# Patient Record
Sex: FEMALE | URBAN - METROPOLITAN AREA
[De-identification: names, ages, dates, MRNs, and addresses within clinical notes are randomized per-mention and may not be internally consistent; named-entity substitution may affect disease eponyms.]

---

## 2022-10-17 ENCOUNTER — APPOINTMENT (RX ONLY)
Dept: URBAN - METROPOLITAN AREA CLINIC 28 | Facility: CLINIC | Age: 67
Setting detail: DERMATOLOGY
End: 2022-10-17

## 2022-10-17 DIAGNOSIS — L30.4 ERYTHEMA INTERTRIGO: ICD-10-CM | Status: INADEQUATELY CONTROLLED

## 2022-10-17 DIAGNOSIS — L82.1 OTHER SEBORRHEIC KERATOSIS: ICD-10-CM

## 2022-10-17 DIAGNOSIS — D22 MELANOCYTIC NEVI: ICD-10-CM

## 2022-10-17 DIAGNOSIS — L81.4 OTHER MELANIN HYPERPIGMENTATION: ICD-10-CM

## 2022-10-17 DIAGNOSIS — D18.0 HEMANGIOMA: ICD-10-CM

## 2022-10-17 DIAGNOSIS — L29.8 OTHER PRURITUS: ICD-10-CM | Status: INADEQUATELY CONTROLLED

## 2022-10-17 DIAGNOSIS — L29.89 OTHER PRURITUS: ICD-10-CM | Status: INADEQUATELY CONTROLLED

## 2022-10-17 DIAGNOSIS — Z71.89 OTHER SPECIFIED COUNSELING: ICD-10-CM

## 2022-10-17 PROBLEM — D18.01 HEMANGIOMA OF SKIN AND SUBCUTANEOUS TISSUE: Status: ACTIVE | Noted: 2022-10-17

## 2022-10-17 PROBLEM — D22.5 MELANOCYTIC NEVI OF TRUNK: Status: ACTIVE | Noted: 2022-10-17

## 2022-10-17 PROCEDURE — ? TREATMENT REGIMEN

## 2022-10-17 PROCEDURE — 99204 OFFICE O/P NEW MOD 45 MIN: CPT | Mod: 25

## 2022-10-17 PROCEDURE — ? SUNSCREEN RECOMMENDATIONS

## 2022-10-17 PROCEDURE — ? PRESCRIPTION

## 2022-10-17 PROCEDURE — ? PHOTO-DOCUMENTATION

## 2022-10-17 PROCEDURE — 11301 SHAVE SKIN LESION 0.6-1.0 CM: CPT

## 2022-10-17 PROCEDURE — ? PRESCRIPTION MEDICATION MANAGEMENT

## 2022-10-17 PROCEDURE — ? SHAVE REMOVAL

## 2022-10-17 PROCEDURE — ? MEDICATION COUNSELING

## 2022-10-17 PROCEDURE — ? COUNSELING

## 2022-10-17 RX ORDER — DOXEPIN HYDROCHLORIDE 25 MG/1
CAPSULE ORAL QHS
Qty: 30 | Refills: 3 | Status: ERX | COMMUNITY
Start: 2022-10-17 | End: 2022-10-25

## 2022-10-17 RX ORDER — LIDOCAINE AND PRILOCAINE 25; 25 MG/G; MG/G
CREAM TOPICAL QDAY
Qty: 30 | Refills: 3 | Status: ERX | COMMUNITY
Start: 2022-10-17 | End: 2022-10-25

## 2022-10-17 RX ORDER — KETOCONAZOLE 20 MG/G
CREAM TOPICAL BID
Qty: 60 | Refills: 3 | Status: ERX | COMMUNITY
Start: 2022-10-17 | End: 2022-10-25

## 2022-10-17 RX ADMIN — DOXEPIN HYDROCHLORIDE 25: 25 CAPSULE ORAL at 00:00

## 2022-10-17 RX ADMIN — LIDOCAINE AND PRILOCAINE 2.5-2.5: 25; 25 CREAM TOPICAL at 00:00

## 2022-10-17 RX ADMIN — KETOCONAZOLE 2: 20 CREAM TOPICAL at 00:00

## 2022-10-17 ASSESSMENT — LOCATION DETAILED DESCRIPTION DERM
LOCATION DETAILED: LEFT MID-UPPER BACK
LOCATION DETAILED: RIGHT ANTERIOR PROXIMAL THIGH
LOCATION DETAILED: SUPERIOR THORACIC SPINE
LOCATION DETAILED: RIGHT DORSAL WRIST
LOCATION DETAILED: LEFT INFERIOR LATERAL UPPER BACK
LOCATION DETAILED: LEFT ANTERIOR PROXIMAL THIGH

## 2022-10-17 ASSESSMENT — LOCATION SIMPLE DESCRIPTION DERM
LOCATION SIMPLE: UPPER BACK
LOCATION SIMPLE: LEFT THIGH
LOCATION SIMPLE: RIGHT WRIST
LOCATION SIMPLE: RIGHT THIGH
LOCATION SIMPLE: LEFT UPPER BACK
LOCATION SIMPLE: LEFT UPPER BACK

## 2022-10-17 ASSESSMENT — LOCATION ZONE DERM
LOCATION ZONE: TRUNK
LOCATION ZONE: ARM
LOCATION ZONE: TRUNK
LOCATION ZONE: LEG

## 2022-10-17 NOTE — PROCEDURE: PRESCRIPTION MEDICATION MANAGEMENT
Detail Level: Zone
Continue Regimen: Pregabalin
Render In Strict Bullet Format?: No
Initiate Treatment: lidocaine-prilocaine 2.5 %-2.5 % topical cream: Apply a thin layer QDAY to arm\\ndoxepin 25 mg capsule: Take one tab po QHS
Initiate Treatment: ketoconazole 2 % topical cream: Apply a thin layer to AA of body BID until clear then only use as needed

## 2022-10-17 NOTE — PROCEDURE: SHAVE REMOVAL
Medical Necessity Information: It is in your best interest to select a reason for this procedure from the list below. All of these items fulfill various CMS LCD requirements except the new and changing color options.
Medical Necessity Clause: This procedure was medically necessary because the lesion that was treated was:
Lab: 6
Lab Facility: 0
Detail Level: Detailed
Was A Bandage Applied: Yes
Size Of Lesion In Cm (Required): 0.8
Biopsy Method: Dermablade
Anesthesia Type: 1% lidocaine with epinephrine
Anesthesia Volume In Cc: 1
Hemostasis: Drysol
Wound Care: Petrolatum
Render Path Notes In Note?: No
Consent was obtained from the patient. The risks and benefits to therapy were discussed in detail. Specifically, the risks of infection, scarring, bleeding, prolonged wound healing, incomplete removal, allergy to anesthesia, nerve injury and recurrence were addressed. Prior to the procedure, the treatment site was clearly identified and confirmed by the patient. All components of Universal Protocol/PAUSE Rule completed.
Post-Care Instructions: I reviewed with the patient in detail post-care instructions. Patient is to keep the biopsy site dry overnight, and then apply bacitracin twice daily until healed. Patient may apply hydrogen peroxide soaks to remove any crusting.
Notification Instructions: Patient will be notified of pathology results. However, patient instructed to call the office if not contacted within 2 weeks.
Billing Type: Third-Party Bill

## 2022-10-17 NOTE — HPI: RASH
What Type Of Note Output Would You Prefer (Optional)?: Standard Output
Is The Patient Presenting As Previously Scheduled?: Yes
How Severe Is Your Rash?: moderate
Is This A New Presentation, Or A Follow-Up?: Rash
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

## 2022-10-17 NOTE — PROCEDURE: TREATMENT REGIMEN
Initiate Treatment: I counseled the patient regarding the following:\\nInstructions: Monthly self-skin checks to monitor for any changes in moles are recommended.\\n\\nExpectations: Benign Nevi are pigmented nests of cells within the skin. No treatment is necessary.\\nContact Office if: Any moles change in size, shape or color; itch, burn or bleed.\\n\\nPatient was reassured that these benign nevi do not appear concerning, and to alert me if any changes (e.g. asymmetry, irregular borders, change in color, size, etc.) are observed.
Detail Level: Zone

## 2022-10-25 ENCOUNTER — APPOINTMENT (RX ONLY)
Dept: URBAN - METROPOLITAN AREA CLINIC 374 | Facility: CLINIC | Age: 67
Setting detail: DERMATOLOGY
End: 2022-10-25

## 2022-10-25 DIAGNOSIS — D485 NEOPLASM OF UNCERTAIN BEHAVIOR OF SKIN: ICD-10-CM

## 2022-10-25 DIAGNOSIS — R20.2 PARESTHESIA OF SKIN: ICD-10-CM | Status: INADEQUATELY CONTROLLED

## 2022-10-25 PROBLEM — D48.5 NEOPLASM OF UNCERTAIN BEHAVIOR OF SKIN: Status: ACTIVE | Noted: 2022-10-25

## 2022-10-25 PROCEDURE — 11301 SHAVE SKIN LESION 0.6-1.0 CM: CPT

## 2022-10-25 PROCEDURE — ? PRESCRIPTION

## 2022-10-25 PROCEDURE — 99214 OFFICE O/P EST MOD 30 MIN: CPT | Mod: 25

## 2022-10-25 PROCEDURE — ? COUNSELING

## 2022-10-25 PROCEDURE — ? PRESCRIPTION MEDICATION MANAGEMENT

## 2022-10-25 PROCEDURE — ? SHAVE REMOVAL

## 2022-10-25 RX ORDER — PRAMOXINE HYDROCHLORIDE 10 MG/ML
LOTION TOPICAL
Qty: 222 | Refills: 2 | Status: ERX | COMMUNITY
Start: 2022-10-25

## 2022-10-25 RX ADMIN — PRAMOXINE HYDROCHLORIDE 1: 10 LOTION TOPICAL at 00:00

## 2022-10-25 ASSESSMENT — LOCATION SIMPLE DESCRIPTION DERM
LOCATION SIMPLE: LEFT BREAST
LOCATION SIMPLE: RIGHT FOREARM

## 2022-10-25 ASSESSMENT — LOCATION DETAILED DESCRIPTION DERM
LOCATION DETAILED: LEFT INFRAMAMMARY CREASE (OUTER QUADRANT)
LOCATION DETAILED: RIGHT DISTAL DORSAL FOREARM

## 2022-10-25 ASSESSMENT — LOCATION ZONE DERM
LOCATION ZONE: ARM
LOCATION ZONE: TRUNK

## 2022-10-25 NOTE — PROCEDURE: PRESCRIPTION MEDICATION MANAGEMENT
Initiate Treatment: Sarna Lotion- apply a thin layer BID to AA of right wrist \\nClobetasol 0.05% cream (given by GYN)- apply a thin layer QDAY to AA of right wrist
Discontinue Regimen: Doxepin\\nLidocaine/prilocaine cream
Render In Strict Bullet Format?: No
Detail Level: Zone

## 2022-10-25 NOTE — PROCEDURE: SHAVE REMOVAL
Medical Necessity Information: It is in your best interest to select a reason for this procedure from the list below. All of these items fulfill various CMS LCD requirements except the new and changing color options.
Medical Necessity Clause: This procedure was medically necessary because the lesion that was treated was:
Lab: 6
Lab Facility: 3
Detail Level: Detailed
Was A Bandage Applied: Yes
Size Of Lesion In Cm (Required): 0.8
X Size Of Lesion In Cm (Optional): 0
Biopsy Method: Dermablade
Anesthesia Type: 1% lidocaine with epinephrine
Anesthesia Volume In Cc: 0.6
Hemostasis: Drysol
Wound Care: Petrolatum
Render Path Notes In Note?: No
Consent was obtained from the patient. The risks and benefits to therapy were discussed in detail. Specifically, the risks of infection, scarring, bleeding, prolonged wound healing, incomplete removal, allergy to anesthesia, nerve injury and recurrence were addressed. Prior to the procedure, the treatment site was clearly identified and confirmed by the patient. All components of Universal Protocol/PAUSE Rule completed.
Post-Care Instructions: I reviewed with the patient in detail post-care instructions. Patient is to keep the biopsy site dry overnight, and then apply bacitracin twice daily until healed. Patient may apply hydrogen peroxide soaks to remove any crusting.
Notification Instructions: Patient will be notified of pathology results. However, patient instructed to call the office if not contacted within 2 weeks.
Billing Type: Third-Party Bill

## 2022-11-29 ENCOUNTER — APPOINTMENT (RX ONLY)
Dept: URBAN - METROPOLITAN AREA CLINIC 28 | Facility: CLINIC | Age: 67
Setting detail: DERMATOLOGY
End: 2022-11-29

## 2022-11-29 DIAGNOSIS — D22 MELANOCYTIC NEVI: ICD-10-CM | Status: IMPROVED

## 2022-11-29 DIAGNOSIS — R20.2 PARESTHESIA OF SKIN: ICD-10-CM | Status: IMPROVED

## 2022-11-29 PROBLEM — D22.5 MELANOCYTIC NEVI OF TRUNK: Status: ACTIVE | Noted: 2022-11-29

## 2022-11-29 PROCEDURE — ? COUNSELING

## 2022-11-29 PROCEDURE — ? PRESCRIPTION SAMPLES PROVIDED

## 2022-11-29 PROCEDURE — 99213 OFFICE O/P EST LOW 20 MIN: CPT

## 2022-11-29 PROCEDURE — ? PATHOLOGY DISCUSSION

## 2022-11-29 PROCEDURE — ? PRESCRIPTION MEDICATION MANAGEMENT

## 2022-11-29 ASSESSMENT — LOCATION DETAILED DESCRIPTION DERM
LOCATION DETAILED: LEFT INFRAMAMMARY CREASE (OUTER QUADRANT)
LOCATION DETAILED: LEFT VENTRAL LATERAL DISTAL FOREARM

## 2022-11-29 ASSESSMENT — LOCATION ZONE DERM
LOCATION ZONE: ARM
LOCATION ZONE: TRUNK

## 2022-11-29 ASSESSMENT — LOCATION SIMPLE DESCRIPTION DERM
LOCATION SIMPLE: LEFT BREAST
LOCATION SIMPLE: LEFT FOREARM

## 2022-11-29 NOTE — PROCEDURE: PRESCRIPTION MEDICATION MANAGEMENT
Discontinue Regimen: Sarna Lotion: apply a thin layer BID to AA of right wrist \\nClobetasol 0.05% cream (given by GYN): apply a thin layer QDAY to AA of right wrist
Render In Strict Bullet Format?: No
Detail Level: Zone

## 2023-05-25 NOTE — H&P (VIEW-ONLY)
Summary of Medicare Well Visit     Pilar Butcher presents today for her Subsequent Medicare AWV (Well visit, UTI symptoms resolved.) and Medical Clearance (Having eye surgery 5/31/23)  .    History  Past Medical History:   Diagnosis Date   • Age-related nuclear cataract of both eyes 05/25/2023   • B12 deficiency 05/07/2020   • Chronic neck pain     • Colitis    • Dry eyes, bilateral 05/21/2021   • Essential hypertension 11/12/2015   • GERD (gastroesophageal reflux disease)    • Lipoma 08/28/2014   • Mixed hyperlipidemia 02/15/2017   • Open-angle glaucoma 05/21/2021   • Osteopenia 01/04/2012   • Peripheral sensory neuropathy 12/05/2018   • Ptosis, bilateral 5/25/2023   • Ptosis, left eyelid 05/25/2023   • Renal cell carcinoma of right kidney 2016   • Trigger finger, right middle finger 05/19/2022   • Vitamin D deficiency 11/12/2015       Her family history includes Diabetes in her father; Glaucoma in her mother; High blood pressure in her father; Hypertension in her father. There is no history of Cancer, Breast cancer, Colon cancer, Early death, Heart disease, High cholesterol, or Stroke.    Social History     Socioeconomic History   • Marital status:      Spouse name: Not on file   • Number of children: Not on file   • Years of education: Not on file   • Highest education level: Not on file   Occupational History   • Not on file   Tobacco Use   • Smoking status: Never   • Smokeless tobacco: Never   Vaping Use   • Vaping status: Not on file   Substance and Sexual Activity   • Alcohol use: Yes     Comment: Socially   • Drug use: No   • Sexual activity: Yes     Partners: Male     Birth control/protection: Post-menopausal   Other Topics Concern   • Exercise No   • Special Diet No   Social History Narrative   • Not on file       Social Determinants of Health     Housing Insecurity: Not on file   Food Needs: Not on file   Transportation: Not on file   Utilities: Not on file   Financial Insecurity: Not on  "file   Medical Access: Not on file   Safety: Not on file   Internet Access: Not on file   Clothing/Household supplies: Not on file   Childcare: Not on file   Employment : Not on file           E-Cigarettes and Vaping   • E-cig or Vape Usage History Never user              /80   Pulse 68   Temp 97.7 °F (36.5 °C) (Temporal)   Resp 12   Ht 5' 4\" (1.626 m)   Wt 176 lb 9.6 oz (80.1 kg)   BMI 30.31 kg/m²       Care Team    Patient Care Team:  Raya Kaplan MD as PCP - General  Raya Kaplan MD as PCP - Insurance PCP      Screenings and Immunizations:    EKG: obtained and reviewed    Immunizations:      Hearing and Vision Screening:  No results found.    AMB MEDICARE FUNCTION & SAFETY SCREEN                                      Assessment and Plan    I have reviewed the patient's    Health Risk Assessment Questionnaire  Functional Assessment   Depression Screen      I recommend referral to the following services and referrals:     Community Services:  None  Medical and Surgical Specialists:  see list       I have created for the patient a list of Personalized Prevention Plan Services (PPPS)                  "

## 2023-05-30 ENCOUNTER — APPOINTMENT (OUTPATIENT)
Dept: PREADMISSION TESTING | Facility: HOSPITAL | Age: 68
End: 2023-05-30
Payer: MEDICARE

## 2023-05-30 ENCOUNTER — ANESTHESIA EVENT (OUTPATIENT)
Dept: SURGERY | Facility: HOSPITAL | Age: 68
Setting detail: HOSPITAL OUTPATIENT SURGERY
End: 2023-05-30
Payer: MEDICARE

## 2023-05-30 VITALS — WEIGHT: 176 LBS | HEIGHT: 64 IN | BODY MASS INDEX: 30.05 KG/M2

## 2023-05-30 ASSESSMENT — PAIN SCALES - GENERAL: PAINLEVEL: 0-NO PAIN

## 2023-05-30 NOTE — PRE-PROCEDURE INSTRUCTIONS
1. We will call you between 3 pm and 7 pm on May 30, 2023 to determine that arrival time for your procedure. If you do not hear by 6PM. Please call [phone] for arrival time.     2. Please report to Main Entrance near Parking lot A, walk into main lobby and report to the admission desk on the first floor on the day of your procedure.    3. Please follow the following fasting guidelines:     No solid food EIGHT HOURS prior to surgery.  Unlimited clear liquids, meaning water or PLAIN black coffee WITHOUT any milk, cream, sugar, or sweetener are permitted up to TWO HOURS prior to arrival at the hospital.  NOTHING TO EAT OR DRINK AFTER MIDNIGHT   PER MD INSTRUCTIONS.      4. Please take ONLY the following medications with a sip of water on the morning of your procedure:   ATENOLOL, PANTOPRAZOLE, CRESTOR     5. Other Instructions: You may brush your teeth the morning of the procedure. Rinse and spit, do not swallow.  Bring a list of your medications with dosages.  Use surgical wash as directed. WASH FACE WITH ANTIBACTERIAL SOAP.   NO ASPRIN, MOTRIN, ADVIL, ALEVE, VITAMINS OR SUPPLEMENTS FROM NOW UNTIL THE PROCEDURE.  TYLENOL IS OK TO TAKE.      6. If you develop a cold, cough, fever, rash, or any other symptom prior to the day of the procedure, please report it to your physician immediately.    7. If you need to cancel the procedure for any reason, please contact your physician.    8. Make arrangements to have safe transportation home accompanied by a responsible adult. If you have not arranged safe transportation home, your surgery will be cancelled. Safe transportation may include private vehicle, ride-share service, taxi and public transportation when accompanied by a responsible adult who will assist you home. A responsible adult is someone known to you and does not include the taxi, ride-share or public transit drive transporting you.    9. You may not take public transportation unless accompanied by a  responsible person.    10. You may not drive a car or operate complex or potentially dangerous machinery for 24 hours following anesthesia and/or sedation.    11.  If it is medically necessary for you to have a longer stay, you will be informed as soon as the decision is made.    12. Only bring essential items to the hospital.  Do not wear or bring anything of value to the hospital including jewelry of any kind, money, or wallet. Do not wear make-up or contact lenses.  DO NOT BRING MEDICATIONS FROM HOME unless instructed to do so. DO bring your hearing aids, glasses, and a case    13. No lotion, creams, powders, or oils on skin the morning of procedure     14. Dress in comfortable clothes.    15.  If instructed, please bring a copy of your Advanced Directive (Living Will/Durable Power of ) on the day of your procedure.     16. Ensuring your safety at all times is a very important part of our [facility] Culture of Safety. After having surgery and sedation, you are at risk for falling and balance issues. Although you may feel awake, the effects of the medication can last up to 24 hours after anesthesia. If you need to use the bathroom during your recovery period, nursing staff will escort you there and stay with you to ensure your safety.    17. Refrain from drinking alcohol and smoking cigarettes/ marijuana for 24 hours prior to surgery.    18. Shower with antibacterial soap (Dial) the night before and morning of your procedure.  If your procedure indicates the need for CHG antiseptic wash (Bactoshield or Hibiclens), please use this instead and follow instructions as discussed at the time of your Pre-Admission Testing phone interview or visit.    Above instructions reviewed with patient and patient acknowledges understanding.

## 2023-05-30 NOTE — DISCHARGE INSTRUCTIONS
NENA MARTINEZ M.D., F.A.C.S.                                  SPECIALIZING IN OCULOPLASTICS                             EYELID, LACRIMAL AND ORBITAL SURGERY     POST-OPERATIVE INSTRUCTIONS FOR EYELID SURGERY:    1. Avoid strenous exercise for one week.  No heavy lifting over 10 pounds.  2. Apply ice compresses to operated eye(s) at least four times a day for 7 days then start warm compresses twice a day for two weeks.   3.  If the surgeon has not placed a bandage over your surgical site, you may get your incisions wet.  You may shower but keep your back to the spray.  4. If severe bleeding, pain or loss of vision occurs call Dr. Martinez/Dr. Chin/Dr. Giron immediately at [phone].    MEDICATIONS: ALL MEDICATIONS HAVE BEEN PREVIOUSLY SENT TO THE PHARMACY    1. Apply antibiotic ointment twice a day to suture line(s).   2. Use eyewash in the morning to remove the residual ointment if needed.   3. Take pain medication as needed.  4. Instill tobradex eye drops twice daily into both eyes  5. Use artificial tears four times a day as needed for dry eyes.   6. No  aspirin for 3 days post operatively.   7. If you were prescribed a medrol dose pack, use as directed. You may start tomorrow.    ADDITIONAL INSTRUCTIONS:    1.  Keep head in position higher than your heart, especially while sleeping.  2.  Protect all bruises from sunshine exposure.  If desired, take Arnica [place] sublingual tablets (an herbal supplement) as directed on package and eat pineapple; these items work naturally to treat bruising.  3.  You may experience temporary blurring of vision, swelling, and/or bruising.  Eyelid should not swell completely shut.  If both eyes were done, these things may be more pronounced on one side.  Symmetry will be assessed at your postop exams.  4. A follow-up appointment in one to three week(s) can be made at the time of surgery or can be scheduled by calling the office at [phone] with Dr. Quinteros/Dr. Chin/Dr. Giron    FOR ANY QUESTIONS OR CONCERNS, PLEASE CALL:                       [phone]                      OR                    [phone]      [address]  [id number]                                                                         [id number]  [place] [address]                                                LEEANNE GIBBS [id number]

## 2023-05-31 ENCOUNTER — ANESTHESIA (OUTPATIENT)
Dept: SURGERY | Facility: HOSPITAL | Age: 68
Setting detail: HOSPITAL OUTPATIENT SURGERY
End: 2023-05-31
Payer: MEDICARE

## 2023-05-31 ENCOUNTER — HOSPITAL ENCOUNTER (OUTPATIENT)
Facility: HOSPITAL | Age: 68
Setting detail: HOSPITAL OUTPATIENT SURGERY
Discharge: HOME | End: 2023-05-31
Attending: OPHTHALMOLOGY | Admitting: OPHTHALMOLOGY
Payer: MEDICARE

## 2023-05-31 VITALS
TEMPERATURE: 98.1 F | BODY MASS INDEX: 29.53 KG/M2 | HEIGHT: 64 IN | OXYGEN SATURATION: 93 % | SYSTOLIC BLOOD PRESSURE: 124 MMHG | DIASTOLIC BLOOD PRESSURE: 65 MMHG | HEART RATE: 61 BPM | RESPIRATION RATE: 16 BRPM | WEIGHT: 173 LBS

## 2023-05-31 PROCEDURE — 71000001 HC PACU PHASE 1 INITIAL 30MIN: Performed by: OPHTHALMOLOGY

## 2023-05-31 PROCEDURE — 25800000 HC PHARMACY IV SOLUTIONS: Performed by: STUDENT IN AN ORGANIZED HEALTH CARE EDUCATION/TRAINING PROGRAM

## 2023-05-31 PROCEDURE — 71000012 HC PACU PHASE 2 EA ADDL MIN: Performed by: OPHTHALMOLOGY

## 2023-05-31 PROCEDURE — 63700000 HC SELF-ADMINISTRABLE DRUG: Performed by: ANESTHESIOLOGY

## 2023-05-31 PROCEDURE — 36000013 HC OR LEVEL 3 EA ADDL MIN: Performed by: OPHTHALMOLOGY

## 2023-05-31 PROCEDURE — 36000003 HC OR LEVEL 3 INITIAL 30MIN: Performed by: OPHTHALMOLOGY

## 2023-05-31 PROCEDURE — 37000002 HC ANESTHESIA MAC: Performed by: OPHTHALMOLOGY

## 2023-05-31 PROCEDURE — 63600000 HC DRUGS/DETAIL CODE: Mod: JW

## 2023-05-31 PROCEDURE — 25000000 HC PHARMACY GENERAL

## 2023-05-31 PROCEDURE — 08SP0ZZ REPOSITION LEFT UPPER EYELID, OPEN APPROACH: ICD-10-PCS | Performed by: OPHTHALMOLOGY

## 2023-05-31 PROCEDURE — 25000000 HC PHARMACY GENERAL: Performed by: OPHTHALMOLOGY

## 2023-05-31 PROCEDURE — 71000011 HC PACU PHASE 1 EA ADDL MIN: Performed by: OPHTHALMOLOGY

## 2023-05-31 PROCEDURE — 27200000 HC STERILE SUPPLY: Performed by: OPHTHALMOLOGY

## 2023-05-31 PROCEDURE — 25800000 HC PHARMACY IV SOLUTIONS

## 2023-05-31 PROCEDURE — 63700000 HC SELF-ADMINISTRABLE DRUG: Performed by: OPHTHALMOLOGY

## 2023-05-31 PROCEDURE — 08SN0ZZ REPOSITION RIGHT UPPER EYELID, OPEN APPROACH: ICD-10-PCS | Performed by: OPHTHALMOLOGY

## 2023-05-31 PROCEDURE — 71000002 HC PACU PHASE 2 INITIAL 30MIN: Performed by: OPHTHALMOLOGY

## 2023-05-31 RX ORDER — DEXTROSE 50 % IN WATER (D50W) INTRAVENOUS SYRINGE
25 AS NEEDED
Status: DISCONTINUED | OUTPATIENT
Start: 2023-05-31 | End: 2023-05-31 | Stop reason: HOSPADM

## 2023-05-31 RX ORDER — ONDANSETRON HYDROCHLORIDE 2 MG/ML
4 INJECTION, SOLUTION INTRAVENOUS
Status: DISCONTINUED | OUTPATIENT
Start: 2023-05-31 | End: 2023-05-31 | Stop reason: HOSPADM

## 2023-05-31 RX ORDER — ACETAMINOPHEN 325 MG/1
975 TABLET ORAL ONCE
Status: COMPLETED | OUTPATIENT
Start: 2023-05-31 | End: 2023-05-31

## 2023-05-31 RX ORDER — PROPOFOL 10 MG/ML
INJECTION, EMULSION INTRAVENOUS CONTINUOUS PRN
Status: DISCONTINUED | OUTPATIENT
Start: 2023-05-31 | End: 2023-05-31 | Stop reason: SURG

## 2023-05-31 RX ORDER — PROPOFOL 200MG/20ML
SYRINGE (ML) INTRAVENOUS AS NEEDED
Status: DISCONTINUED | OUTPATIENT
Start: 2023-05-31 | End: 2023-05-31 | Stop reason: SURG

## 2023-05-31 RX ORDER — SODIUM CHLORIDE 9 MG/ML
INJECTION, SOLUTION INTRAVENOUS CONTINUOUS
Status: DISCONTINUED | OUTPATIENT
Start: 2023-05-31 | End: 2023-05-31 | Stop reason: HOSPADM

## 2023-05-31 RX ORDER — PROPARACAINE HYDROCHLORIDE 5 MG/ML
SOLUTION/ DROPS OPHTHALMIC
Status: DISCONTINUED | OUTPATIENT
Start: 2023-05-31 | End: 2023-05-31 | Stop reason: HOSPADM

## 2023-05-31 RX ORDER — ONDANSETRON HYDROCHLORIDE 2 MG/ML
INJECTION, SOLUTION INTRAVENOUS AS NEEDED
Status: DISCONTINUED | OUTPATIENT
Start: 2023-05-31 | End: 2023-05-31 | Stop reason: SURG

## 2023-05-31 RX ORDER — FENTANYL CITRATE 50 UG/ML
50 INJECTION, SOLUTION INTRAMUSCULAR; INTRAVENOUS EVERY 5 MIN PRN
Status: DISCONTINUED | OUTPATIENT
Start: 2023-05-31 | End: 2023-05-31 | Stop reason: HOSPADM

## 2023-05-31 RX ORDER — DEXTROSE 40 %
15-30 GEL (GRAM) ORAL AS NEEDED
Status: DISCONTINUED | OUTPATIENT
Start: 2023-05-31 | End: 2023-05-31 | Stop reason: HOSPADM

## 2023-05-31 RX ORDER — TRANEXAMIC ACID 100 MG/ML
INJECTION, SOLUTION INTRAVENOUS
Status: DISCONTINUED | OUTPATIENT
Start: 2023-05-31 | End: 2023-05-31 | Stop reason: HOSPADM

## 2023-05-31 RX ORDER — BUPIVACAINE HYDROCHLORIDE AND EPINEPHRINE 2.5; 5 MG/ML; UG/ML
INJECTION, SOLUTION EPIDURAL; INFILTRATION; INTRACAUDAL; PERINEURAL
Status: DISCONTINUED | OUTPATIENT
Start: 2023-05-31 | End: 2023-05-31 | Stop reason: HOSPADM

## 2023-05-31 RX ORDER — HYDROCODONE BITARTRATE AND ACETAMINOPHEN 5; 325 MG/1; MG/1
1 TABLET ORAL ONCE
Status: DISCONTINUED | OUTPATIENT
Start: 2023-05-31 | End: 2023-05-31 | Stop reason: HOSPADM

## 2023-05-31 RX ORDER — FENTANYL CITRATE 50 UG/ML
INJECTION, SOLUTION INTRAMUSCULAR; INTRAVENOUS AS NEEDED
Status: DISCONTINUED | OUTPATIENT
Start: 2023-05-31 | End: 2023-05-31 | Stop reason: SURG

## 2023-05-31 RX ORDER — MIDAZOLAM HYDROCHLORIDE 2 MG/2ML
INJECTION, SOLUTION INTRAMUSCULAR; INTRAVENOUS AS NEEDED
Status: DISCONTINUED | OUTPATIENT
Start: 2023-05-31 | End: 2023-05-31 | Stop reason: SURG

## 2023-05-31 RX ORDER — DEXAMETHASONE SODIUM PHOSPHATE 4 MG/ML
INJECTION, SOLUTION INTRA-ARTICULAR; INTRALESIONAL; INTRAMUSCULAR; INTRAVENOUS; SOFT TISSUE AS NEEDED
Status: DISCONTINUED | OUTPATIENT
Start: 2023-05-31 | End: 2023-05-31 | Stop reason: SURG

## 2023-05-31 RX ORDER — LIDOCAINE HYDROCHLORIDE 10 MG/ML
INJECTION, SOLUTION EPIDURAL; INFILTRATION; INTRACAUDAL; PERINEURAL AS NEEDED
Status: DISCONTINUED | OUTPATIENT
Start: 2023-05-31 | End: 2023-05-31 | Stop reason: SURG

## 2023-05-31 RX ORDER — IBUPROFEN 200 MG
16-32 TABLET ORAL AS NEEDED
Status: DISCONTINUED | OUTPATIENT
Start: 2023-05-31 | End: 2023-05-31 | Stop reason: HOSPADM

## 2023-05-31 RX ORDER — LIDOCAINE HCL/EPINEPHRINE/PF 2%-1:200K
VIAL (ML) INJECTION
Status: DISCONTINUED | OUTPATIENT
Start: 2023-05-31 | End: 2023-05-31 | Stop reason: HOSPADM

## 2023-05-31 RX ORDER — HYDROMORPHONE HYDROCHLORIDE 1 MG/ML
0.5 INJECTION, SOLUTION INTRAMUSCULAR; INTRAVENOUS; SUBCUTANEOUS
Status: DISCONTINUED | OUTPATIENT
Start: 2023-05-31 | End: 2023-05-31 | Stop reason: HOSPADM

## 2023-05-31 RX ADMIN — DEXMEDETOMIDINE HYDROCHLORIDE 0.4 MCG/KG/HR: 100 INJECTION, SOLUTION INTRAVENOUS at 07:53

## 2023-05-31 RX ADMIN — MIDAZOLAM HYDROCHLORIDE 1 MG: 1 INJECTION, SOLUTION INTRAMUSCULAR; INTRAVENOUS at 07:47

## 2023-05-31 RX ADMIN — FENTANYL CITRATE 25 MCG: 50 INJECTION, SOLUTION INTRAMUSCULAR; INTRAVENOUS at 08:21

## 2023-05-31 RX ADMIN — MIDAZOLAM HYDROCHLORIDE 1 MG: 1 INJECTION, SOLUTION INTRAMUSCULAR; INTRAVENOUS at 08:21

## 2023-05-31 RX ADMIN — SODIUM CHLORIDE: 9 INJECTION, SOLUTION INTRAVENOUS at 07:34

## 2023-05-31 RX ADMIN — LIDOCAINE HYDROCHLORIDE 5 ML: 10 INJECTION, SOLUTION EPIDURAL; INFILTRATION; INTRACAUDAL; PERINEURAL at 07:47

## 2023-05-31 RX ADMIN — FENTANYL CITRATE 25 MCG: 50 INJECTION, SOLUTION INTRAMUSCULAR; INTRAVENOUS at 08:02

## 2023-05-31 RX ADMIN — DEXAMETHASONE SODIUM PHOSPHATE 8 MG: 4 INJECTION, SOLUTION INTRAMUSCULAR; INTRAVENOUS at 07:47

## 2023-05-31 RX ADMIN — PROPOFOL 50 MG: 10 INJECTION, EMULSION INTRAVENOUS at 07:47

## 2023-05-31 RX ADMIN — ACETAMINOPHEN 975 MG: 325 TABLET, FILM COATED ORAL at 10:17

## 2023-05-31 RX ADMIN — FENTANYL CITRATE 50 MCG: 50 INJECTION, SOLUTION INTRAMUSCULAR; INTRAVENOUS at 07:47

## 2023-05-31 RX ADMIN — SODIUM CHLORIDE: 9 INJECTION, SOLUTION INTRAVENOUS at 06:46

## 2023-05-31 RX ADMIN — PROPOFOL 20 MCG/KG/MIN: 10 INJECTION, EMULSION INTRAVENOUS at 07:55

## 2023-05-31 RX ADMIN — ONDANSETRON HYDROCHLORIDE 4 MG: 2 SOLUTION INTRAMUSCULAR; INTRAVENOUS at 08:27

## 2023-05-31 NOTE — OR SURGEON
Pre-Procedure patient identification:  I am the primary operating surgeon/proceduralist and I have reviewed the applicable pathology reports and radiology studies for this procedure. I have identified the patient on 05/31/23 at 7:25 AM Pilar Quinteros MD  Phone Number: [phone]

## 2023-05-31 NOTE — OP NOTE
Patient: Pilar Butcher  Medical Record Number: [id number]  Date of Surgery: 5/31/2023   Surgeon(s) and Role:     * Pilar Quinteros MD - Primary     * Haroldo Moya DO - Resident - Assisting    Preoperative Diagnosis:   1. Bilateral upper eyelid myogenic ptosis      Postoperative Diagnosis: same    Procedures:   1. Bilateral upper eyelid myogenic ptosis repair with levator advancement with skin pinch    Anesthesia: MAC with local    EBL: none    Complications:none    Indication: The patient has visually significant upper eyelid ptosis impacting her superior field of vision in both eyes.    Procedure: In the pre-operative holding area, the risks, benefits, and alternatives of the above procedures were again discussed in detail with the patient. All questions were answered. Informed consent was obtained and placed into the medical record.    In the operating room, the patient was identified by name and date of birth. Monitored anesthesia care was initiated by the anesthesiology team, and the patient was marked and prepped in the usual fashion for ophthalmic plastic surgery. Approximately 2cc of local anesthetic solution (2% lidocaine with 1:100,000 epinephrine) was injected into the upper eyelids.    Attention was first directed to the patient’s right side, where excess upper eyelid skin was grasped and the superior border of the incision site was marked. The inferior border was also demarcated at the level of the eyelid crease. Incisions were made along the marked lines using a 15 blade. Noemi scissors were used to remove the excess skin as well as a thin strip of orbicularis muscle. Hemostasis was achieved using a combination of Gelfoam soaked in thrombin, as well as bipolar cautery.     Careful dissection was performed and the aponeurosis of the levator muscle was identified and found to be dehisced. Multiple 6-0 nylon sutures were used to reattach the aponeurosis to the superior border of the  tarsus. A symmetric procedure was performed on the left side. Appropriate eyelid height and contour were confirmed with the patient looking in primary gaze. The skin wound was closed using interrupted and running 5-0 fast absorving plain gut.     At the conclusion of the case, Maxitrol ointment was then applied to the operative sites and inside the eyes. The patient tolerated the procedure well and there were no complications. She was transferred to the recovery area in stable condition.

## 2024-03-11 ENCOUNTER — APPOINTMENT (RX ONLY)
Dept: URBAN - METROPOLITAN AREA CLINIC 28 | Facility: CLINIC | Age: 69
Setting detail: DERMATOLOGY
End: 2024-03-11

## 2024-03-11 DIAGNOSIS — L30.4 ERYTHEMA INTERTRIGO: ICD-10-CM

## 2024-03-11 DIAGNOSIS — H01.13 ECZEMATOUS DERMATITIS OF EYELID: ICD-10-CM

## 2024-03-11 DIAGNOSIS — L20.89 OTHER ATOPIC DERMATITIS: ICD-10-CM

## 2024-03-11 PROBLEM — H01.131 ECZEMATOUS DERMATITIS OF RIGHT UPPER EYELID: Status: ACTIVE | Noted: 2024-03-11

## 2024-03-11 PROBLEM — H01.134 ECZEMATOUS DERMATITIS OF LEFT UPPER EYELID: Status: ACTIVE | Noted: 2024-03-11

## 2024-03-11 PROCEDURE — 99213 OFFICE O/P EST LOW 20 MIN: CPT

## 2024-03-11 PROCEDURE — ? COUNSELING

## 2024-03-11 PROCEDURE — ? PRESCRIPTION MEDICATION MANAGEMENT

## 2024-03-11 PROCEDURE — ? PRESCRIPTION

## 2024-03-11 RX ORDER — KETOCONAZOLE 20 MG/ML
1 SHAMPOO, SUSPENSION TOPICAL
Qty: 120 | Refills: 4 | Status: ERX | COMMUNITY
Start: 2024-03-11

## 2024-03-11 RX ORDER — HYDROCORTISONE 25 MG/G
1 CREAM TOPICAL BID
Qty: 30 | Refills: 1 | Status: ERX | COMMUNITY
Start: 2024-03-11

## 2024-03-11 RX ORDER — TACROLIMUS 1 MG/G
1 OINTMENT TOPICAL BID
Qty: 60 | Refills: 3 | Status: ERX | COMMUNITY
Start: 2024-03-11

## 2024-03-11 RX ORDER — TRIAMCINOLONE ACETONIDE 1 MG/G
1 CREAM TOPICAL BID
Qty: 454 | Refills: 1 | Status: ERX | COMMUNITY
Start: 2024-03-11

## 2024-03-11 RX ORDER — KETOCONAZOLE 20 MG/G
1 CREAM TOPICAL BID
Qty: 60 | Refills: 3 | Status: ERX | COMMUNITY
Start: 2024-03-11

## 2024-03-11 RX ADMIN — KETOCONAZOLE 1: 20 CREAM TOPICAL at 00:00

## 2024-03-11 RX ADMIN — HYDROCORTISONE 1: 25 CREAM TOPICAL at 00:00

## 2024-03-11 RX ADMIN — TACROLIMUS 1: 1 OINTMENT TOPICAL at 00:00

## 2024-03-11 RX ADMIN — KETOCONAZOLE 1: 20 SHAMPOO, SUSPENSION TOPICAL at 00:00

## 2024-03-11 RX ADMIN — TRIAMCINOLONE ACETONIDE 1: 1 CREAM TOPICAL at 00:00

## 2024-03-11 ASSESSMENT — LOCATION DETAILED DESCRIPTION DERM
LOCATION DETAILED: RIGHT ANTECUBITAL SKIN
LOCATION DETAILED: LEFT ANTECUBITAL SKIN
LOCATION DETAILED: LEFT POPLITEAL SKIN
LOCATION DETAILED: RIGHT MEDIAL POSTERIOR THIGH
LOCATION DETAILED: RIGHT POPLITEAL SKIN
LOCATION DETAILED: RIGHT MEDIAL SUPERIOR EYELID
LOCATION DETAILED: LEFT MEDIAL POSTERIOR THIGH
LOCATION DETAILED: LEFT LATERAL SUPERIOR EYELID

## 2024-03-11 ASSESSMENT — LOCATION ZONE DERM
LOCATION ZONE: EYELID
LOCATION ZONE: ARM
LOCATION ZONE: LEG

## 2024-03-11 ASSESSMENT — LOCATION SIMPLE DESCRIPTION DERM
LOCATION SIMPLE: RIGHT INNER THIGH
LOCATION SIMPLE: LEFT UPPER ARM
LOCATION SIMPLE: LEFT SUPERIOR EYELID
LOCATION SIMPLE: RIGHT SUPERIOR EYELID
LOCATION SIMPLE: LEFT POPLITEAL SKIN
LOCATION SIMPLE: RIGHT POPLITEAL SKIN
LOCATION SIMPLE: RIGHT UPPER ARM
LOCATION SIMPLE: LEFT INNER THIGH

## 2024-03-11 NOTE — PROCEDURE: PRESCRIPTION MEDICATION MANAGEMENT
Initiate Treatment: tacrolimus 0.1 % topical ointment: Apply to eyelids BID x 2 weeks or until clear and then use as needed for flares
Detail Level: Simple
Render In Strict Bullet Format?: No
Initiate Treatment: ketoconazole 2 % shampoo: Wash AA with shampoo. Leave on for 5 to 10 minutes then rinse.\\nketoconazole 2 % topical cream: Apply to skin folds/groin BID for 3 weeks\\nhydrocortisone 2.5 % topical cream: Mix equal parts Ketoconazole cream and Hydrocortisone 2.5% cream and apply to affected areas BID x2 weeks
Initiate Treatment: triamcinolone acetonide 0.1 % topical cream: apply to affected area of eczema on areas and legs BID x 2 weeks, taper with improvement. Then use as needed for flares. Do NOT use on face or groin.

## 2024-04-01 ENCOUNTER — APPOINTMENT (RX ONLY)
Dept: URBAN - METROPOLITAN AREA CLINIC 28 | Facility: CLINIC | Age: 69
Setting detail: DERMATOLOGY
End: 2024-04-01

## 2024-04-01 DIAGNOSIS — H01.13 ECZEMATOUS DERMATITIS OF EYELID: ICD-10-CM | Status: IMPROVED

## 2024-04-01 DIAGNOSIS — L20.89 OTHER ATOPIC DERMATITIS: ICD-10-CM | Status: WELL CONTROLLED

## 2024-04-01 DIAGNOSIS — L30.4 ERYTHEMA INTERTRIGO: ICD-10-CM | Status: WELL CONTROLLED

## 2024-04-01 PROBLEM — H01.134 ECZEMATOUS DERMATITIS OF LEFT UPPER EYELID: Status: ACTIVE | Noted: 2024-04-01

## 2024-04-01 PROBLEM — H01.131 ECZEMATOUS DERMATITIS OF RIGHT UPPER EYELID: Status: ACTIVE | Noted: 2024-04-01

## 2024-04-01 PROCEDURE — 99213 OFFICE O/P EST LOW 20 MIN: CPT

## 2024-04-01 PROCEDURE — ? PRESCRIPTION MEDICATION MANAGEMENT

## 2024-04-01 PROCEDURE — ? COUNSELING

## 2024-04-01 PROCEDURE — ? PRESCRIPTION

## 2024-04-01 RX ORDER — KETOCONAZOLE 20 MG/ML
1 SHAMPOO, SUSPENSION TOPICAL
Qty: 120 | Refills: 4 | Status: ERX

## 2024-04-01 RX ORDER — PIMECROLIMUS 10 MG/G
CREAM TOPICAL
Qty: 100 | Refills: 2 | Status: ERX | COMMUNITY
Start: 2024-04-01

## 2024-04-01 RX ADMIN — PIMECROLIMUS: 10 CREAM TOPICAL at 00:00

## 2024-04-01 ASSESSMENT — LOCATION SIMPLE DESCRIPTION DERM
LOCATION SIMPLE: LEFT INNER THIGH
LOCATION SIMPLE: LEFT UPPER ARM
LOCATION SIMPLE: RIGHT POPLITEAL SKIN
LOCATION SIMPLE: LEFT POPLITEAL SKIN
LOCATION SIMPLE: LEFT SUPERIOR EYELID
LOCATION SIMPLE: RIGHT UPPER ARM
LOCATION SIMPLE: RIGHT SUPERIOR EYELID
LOCATION SIMPLE: RIGHT INNER THIGH

## 2024-04-01 ASSESSMENT — LOCATION DETAILED DESCRIPTION DERM
LOCATION DETAILED: LEFT MEDIAL POSTERIOR THIGH
LOCATION DETAILED: LEFT ANTECUBITAL SKIN
LOCATION DETAILED: RIGHT ANTECUBITAL SKIN
LOCATION DETAILED: LEFT POPLITEAL SKIN
LOCATION DETAILED: RIGHT MEDIAL POSTERIOR THIGH
LOCATION DETAILED: LEFT LATERAL SUPERIOR EYELID
LOCATION DETAILED: RIGHT POPLITEAL SKIN
LOCATION DETAILED: RIGHT MEDIAL SUPERIOR EYELID

## 2024-04-01 ASSESSMENT — LOCATION ZONE DERM
LOCATION ZONE: ARM
LOCATION ZONE: LEG
LOCATION ZONE: EYELID

## 2024-04-01 NOTE — PROCEDURE: PRESCRIPTION MEDICATION MANAGEMENT
Initiate Treatment: pimecrolimus 1 % topical cream : Apply to eyelids BID x 2 weeks or until clear and then use as needed for flares
Discontinue Regimen: tacrolimus 0.1 % topical ointment: Apply to eyelids BID x 2 weeks or until clear and then use as needed for flares
Detail Level: Simple
Render In Strict Bullet Format?: No
Continue Regimen: ketoconazole 2 % shampoo: Wash AA with shampoo. Leave on for 5 to 10 minutes then rinse.\\nketoconazole 2 % topical cream: Apply to skin folds/groin BID for 3 weeks\\nhydrocortisone 2.5 % topical cream: Mix equal parts Ketoconazole cream and Hydrocortisone 2.5% cream and apply to affected areas BID x2 weeks
Continue Regimen: triamcinolone acetonide 0.1 % topical cream: apply to affected area of eczema on areas and legs.  PRN flares. Do NOT use on face or groin.

## 2024-04-01 NOTE — PROCEDURE: COUNSELING
Detail Level: Zone Smoking even a single puff increases the likelihood of a full relapse, withdrawal symptoms peak within 1-2 weeks, but can persist for months

## 2024-11-06 NOTE — PROCEDURE: MEDICATION COUNSELING
heart disease ADMIT Dupixent Pregnancy And Lactation Text: This medication likely crosses the placenta but the risk for the fetus is uncertain. This medication is excreted in breast milk.

## 2025-05-14 NOTE — HPI: EVALUATION OF SKIN LESION(S)
What Type Of Note Output Would You Prefer (Optional)?: Standard Output
Hpi Title: Evaluation of a Skin Lesion
How Severe Are Your Spot(S)?: moderate
as evidenced by pt report of limited POs x 2 weeks; NPO diet

## (undated) DEVICE — BLADE SCALPEL #15

## (undated) DEVICE — SYRINGE DISP LUER-LOK  5 CC

## (undated) DEVICE — APPLICATOR COTTON TIP STER 6IN MEDC 2/PK

## (undated) DEVICE — COVER LIGHTHANDLE

## (undated) DEVICE — DRESSING SPONGE GAUZE 2X2 STER 50/BX

## (undated) DEVICE — ***USE 144827*** MASK SWISS THERAPY

## (undated) DEVICE — CORD BI-POLAR DISP STERILE

## (undated) DEVICE — SUTURE PLAIN GUT 5-0   1915G

## (undated) DEVICE — GLOVE PROTEXIS LATEX MICRO 6.5

## (undated) DEVICE — Device

## (undated) DEVICE — TOWEL SURGICAL W17XL27IN BLUE COTTON STANDARD PREWASHED DELI

## (undated) DEVICE — SUTURE MONOSOF 6-0 BLACK 1X18 P-10

## (undated) DEVICE — NEEDLE DISP HYPO 25GX5/8IN